# Patient Record
Sex: FEMALE | Race: WHITE | Employment: PART TIME | URBAN - METROPOLITAN AREA
[De-identification: names, ages, dates, MRNs, and addresses within clinical notes are randomized per-mention and may not be internally consistent; named-entity substitution may affect disease eponyms.]

---

## 2021-01-15 DIAGNOSIS — Z23 NEED FOR VACCINATION: ICD-10-CM

## 2023-04-05 PROBLEM — M16.9 OSTEOARTHRITIS OF HIP: Status: ACTIVE | Noted: 2023-04-05

## 2023-05-23 PROBLEM — M16.11 PRIMARY OSTEOARTHRITIS OF RIGHT HIP: Status: ACTIVE | Noted: 2023-04-05

## 2024-06-06 ENCOUNTER — OFFICE VISIT (OUTPATIENT)
Dept: URGENT CARE | Facility: PHYSICIAN GROUP | Age: 74
End: 2024-06-06
Payer: COMMERCIAL

## 2024-06-06 VITALS
RESPIRATION RATE: 14 BRPM | HEART RATE: 79 BPM | SYSTOLIC BLOOD PRESSURE: 124 MMHG | DIASTOLIC BLOOD PRESSURE: 82 MMHG | BODY MASS INDEX: 23.88 KG/M2 | OXYGEN SATURATION: 98 % | WEIGHT: 161.2 LBS | HEIGHT: 69 IN | TEMPERATURE: 97.4 F

## 2024-06-06 DIAGNOSIS — T80.90XA INFUSION REACTION, INITIAL ENCOUNTER: ICD-10-CM

## 2024-06-06 PROCEDURE — 3079F DIAST BP 80-89 MM HG: CPT

## 2024-06-06 PROCEDURE — 99213 OFFICE O/P EST LOW 20 MIN: CPT

## 2024-06-06 PROCEDURE — 3074F SYST BP LT 130 MM HG: CPT

## 2024-06-06 RX ORDER — CETIRIZINE HYDROCHLORIDE 10 MG/1
10 TABLET ORAL DAILY
Qty: 30 TABLET | Refills: 0 | Status: SHIPPED | OUTPATIENT
Start: 2024-06-06

## 2024-06-06 RX ORDER — TRIAMCINOLONE ACETONIDE 40 MG/ML
40 INJECTION, SUSPENSION INTRA-ARTICULAR; INTRAMUSCULAR ONCE
Status: COMPLETED | OUTPATIENT
Start: 2024-06-06 | End: 2024-06-06

## 2024-06-06 RX ORDER — HYDROXYZINE HYDROCHLORIDE 25 MG/1
25 TABLET, FILM COATED ORAL
Qty: 30 TABLET | Refills: 1 | Status: SHIPPED | OUTPATIENT
Start: 2024-06-06 | End: 2024-07-06

## 2024-06-06 RX ORDER — ASPIRIN 81 MG/1
81 TABLET ORAL DAILY
COMMUNITY

## 2024-06-06 RX ADMIN — TRIAMCINOLONE ACETONIDE 40 MG: 40 INJECTION, SUSPENSION INTRA-ARTICULAR; INTRAMUSCULAR at 12:49

## 2024-06-06 NOTE — PROGRESS NOTES
Subjective:   Genoveva Elena is a 74 y.o. female who presents for Rash (Rash on upper body, neck and arms X 2 days. )      HPI:    Patient presents urgent care with concerns of a few direction after she got her second Tepezza injection on Tuesday.  She states she broke out with a scattered pink bumpy and very itchy 2 days ago.  She is receiving the medication for Graves disease Dr. Aaliyah Lizama and Dr. Curry are managing the medication. Took Benadryl which has only helped a little. Denies chest pain, shortness of breath, dizziness, palpitations, leg swelling, orthopnea, cough.  Denies sensation of throat swelling, tongue swelling, lip swelling.     ROS As above in HPI    Medications:    Current Outpatient Medications on File Prior to Visit   Medication Sig Dispense Refill    aspirin 81 MG EC tablet Take 81 mg by mouth every day.      methimazole (TAPAZOLE) 5 MG Tab       vitamin D2, Ergocalciferol, (DRISDOL) 1.25 MG (65419 UT) Cap capsule       metoprolol tartrate (LOPRESSOR) 25 MG Tab Take 25 mg by mouth.      calcium carbonate (OS-SARIKA 500) 1250 (500 Ca) MG Tab       irbesartan-hydrochlorothiazide (AVALIDE) 150-12.5 MG per tablet       pravastatin (PRAVACHOL) 10 MG Tab        No current facility-administered medications on file prior to visit.        Allergies:   Patient has no known allergies.    Problem List:   Patient Active Problem List   Diagnosis    Osteoarthritis of hip    Primary osteoarthritis of right hip        Surgical History:  Past Surgical History:   Procedure Laterality Date    [place] TOTAL HIP ARTHROPLASTY Right 6/29/2023    Procedure: RIGHT ANTERIOR TOTAL HIP ARTHROPLASTY;  Surgeon: Barak Almanza M.D.;  Location: [facility];  Service: Orthopedics       Past Social Hx:   Social History     Tobacco Use    Smoking status: Every Day     Current packs/day: 0.50     Types: Cigarettes     Passive exposure: Current    Smokeless tobacco: Never          Problem list, medications, and allergies  "reviewed by myself today in Epic.     Objective:     /82 (BP Location: Left arm, Patient Position: Sitting, BP Cuff Size: Adult)   Pulse 79   Temp 36.3 °C (97.4 °F) (Temporal)   Resp 14   Ht 1.753 m (5' 9\")   Wt 73.1 kg (161 lb 3.2 oz)   SpO2 98%   BMI 23.81 kg/m²     Physical Exam  Vitals and nursing note reviewed.   Constitutional:       General: She is not in acute distress.     Appearance: Normal appearance. She is not ill-appearing or diaphoretic.   HENT:      Head: Normocephalic.      Right Ear: Tympanic membrane and ear canal normal.      Left Ear: Tympanic membrane and ear canal normal.      Nose: Nose normal.      Mouth/Throat:      Mouth: Mucous membranes are moist. No angioedema.      Pharynx: Oropharynx is clear. Uvula midline. No uvula swelling.   Eyes:      Extraocular Movements: Extraocular movements intact.      Conjunctiva/sclera: Conjunctivae normal.      Pupils: Pupils are equal, round, and reactive to light.   Cardiovascular:      Rate and Rhythm: Normal rate and regular rhythm.      Heart sounds: Normal heart sounds.   Pulmonary:      Effort: Pulmonary effort is normal.      Breath sounds: Normal breath sounds. No stridor. No wheezing, rhonchi or rales.   Chest:      Chest wall: No tenderness.   Abdominal:      General: Abdomen is flat. Bowel sounds are normal. There is no distension.      Palpations: Abdomen is soft. There is no mass.      Tenderness: There is no abdominal tenderness. There is no right CVA tenderness, left CVA tenderness, guarding or rebound.      Hernia: No hernia is present.   Skin:     General: Skin is warm and dry.      Capillary Refill: Capillary refill takes less than 2 seconds.      Findings: Rash present.      Comments: Generalized pink papular rash appreciated on all surfaces of her body.  No vesicles, bullae, urticaria, scaling, pustules.   Neurological:      Mental Status: She is alert and oriented to person, place, and time.         Assessment/Plan: "     Diagnosis and associated orders:   1. Infusion reaction, initial encounter  - triamcinolone acetonide (Kenalog-40) injection 40 mg  - cetirizine (ZYRTEC) 10 MG Tab; Take 1 Tablet by mouth every day.  Dispense: 30 Tablet; Refill: 0  - hydrOXYzine HCl (ATARAX) 25 MG Tab; Take 1 Tablet by mouth at bedtime as needed for Itching (allergies) for up to 30 days.  Dispense: 30 Tablet; Refill: 1    Other orders  - aspirin 81 MG EC tablet; Take 81 mg by mouth every day.        Comments/MDM:     This patient presents with symptoms consistent with possible infusion reaction. No evidence of airway compromise or shock at this time. Plan to treat for an allergic reaction with H1/H2 blockers, steroids. No indication for epinephrine at this time. Given lack of respiratory symptoms, no indication for EpiPen Rx.    Follow up with medical team managing Tepezza and infusion center.  Strict return to ER precautions reviewed.     Return to clinic or go to ED if symptoms worsen or persist. Indications for ED discussed at length. Patient/Parent/Guardian voices understanding. Follow-up with your primary care provider in 3-5 days. Red flag symptoms discussed. All side effects of medication discussed including allergic response, GI upset, tendon injury, rash, sedation etc.    Please note that this dictation was created using voice recognition software. I have made a reasonable attempt to correct obvious errors, but I expect that there are errors of grammar and possibly content that I did not discover before finalizing the note.    This note was electronically signed by CITLALLI Garcia